# Patient Record
Sex: FEMALE | Employment: OTHER | URBAN - METROPOLITAN AREA
[De-identification: names, ages, dates, MRNs, and addresses within clinical notes are randomized per-mention and may not be internally consistent; named-entity substitution may affect disease eponyms.]

---

## 2018-08-16 ENCOUNTER — CONSULTATION (OUTPATIENT)
Dept: URBAN - METROPOLITAN AREA CLINIC 11 | Facility: CLINIC | Age: 63
End: 2018-08-16

## 2018-08-16 VITALS — SYSTOLIC BLOOD PRESSURE: 158 MMHG | HEIGHT: 55 IN | DIASTOLIC BLOOD PRESSURE: 84 MMHG

## 2018-08-16 ASSESSMENT — VISUAL ACUITY
OS_SC: 20/40-2
OD_PH: 20/50-2
OS_PH: 20/30-1
OD_SC: 20/100-1

## 2018-08-16 ASSESSMENT — TONOMETRY
OD_IOP_MMHG: 13
OS_IOP_MMHG: 12

## 2018-08-16 NOTE — PATIENT DISCUSSION
The risk of converting to wet macular degeneration and of suffering vision loss is only about 10 percent.

## 2018-08-16 NOTE — PATIENT DISCUSSION
Greater than 50% of exam spent in dialogue with patient. I have recommended that she begin using EVOA AREDS II vitamins.

## 2022-06-19 RX ORDER — PAROXETINE HYDROCHLORIDE 20 MG/1
TABLET, FILM COATED ORAL
COMMUNITY

## 2022-06-19 RX ORDER — CONJUGATED ESTROGENS 0.62 MG/G
CREAM VAGINAL
COMMUNITY

## 2022-06-19 RX ORDER — IRBESARTAN AND HYDROCHLOROTHIAZIDE 150; 12.5 MG/1; MG/1
TABLET, FILM COATED ORAL
COMMUNITY

## 2022-06-19 RX ORDER — IRBESARTAN AND HYDROCHLOROTHIAZIDE 300; 12.5 MG/1; MG/1
TABLET, FILM COATED ORAL
COMMUNITY

## 2022-06-19 RX ORDER — ATORVASTATIN CALCIUM 10 MG/1
TABLET, FILM COATED ORAL
COMMUNITY

## 2022-09-19 NOTE — PATIENT DISCUSSION
Ching Luo placed a CL in the OD Wednesday of a Comifilcon A 8.7 BC +0.50 -1.25x100. Patient stated it sharpened distance and was able to read.

## 2022-09-22 NOTE — PATIENT DISCUSSION
Velia Davis placed a CL in the OD Wednesday of a Comifilcon A 8.7 BC +0.50 -1.25x100. Patient stated it sharpened distance and was able to read.